# Patient Record
Sex: FEMALE | Race: WHITE
[De-identification: names, ages, dates, MRNs, and addresses within clinical notes are randomized per-mention and may not be internally consistent; named-entity substitution may affect disease eponyms.]

---

## 2018-07-14 ENCOUNTER — HOSPITAL ENCOUNTER (EMERGENCY)
Dept: HOSPITAL 12 - ER | Age: 18
Discharge: HOME | End: 2018-07-14
Payer: COMMERCIAL

## 2018-07-14 VITALS — BODY MASS INDEX: 18.48 KG/M2 | WEIGHT: 115 LBS | HEIGHT: 66 IN

## 2018-07-14 DIAGNOSIS — S90.212A: Primary | ICD-10-CM
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
## 2018-07-14 PROCEDURE — A4663 DIALYSIS BLOOD PRESSURE CUFF: HCPCS

## 2018-07-14 NOTE — NUR
pt [id number], ambulatory in asteady gait.pt c/o right thigh pain and bruising s/p 
metal pole fell on the pt, denies hitting head.pt able to move all 
ext.breathing even unalabored, abd soft non distended with +BS,denies urinary 
symptoms,skin w/d to touch with good skin turgor.bed in the lowest 
position,locked,HOB up,SR up X2 for safety, CB within reach, will continue to 
monitor pt